# Patient Record
Sex: FEMALE | Race: WHITE | NOT HISPANIC OR LATINO | Employment: FULL TIME | URBAN - METROPOLITAN AREA
[De-identification: names, ages, dates, MRNs, and addresses within clinical notes are randomized per-mention and may not be internally consistent; named-entity substitution may affect disease eponyms.]

---

## 2017-08-31 ENCOUNTER — TRANSCRIBE ORDERS (OUTPATIENT)
Dept: ADMINISTRATIVE | Facility: HOSPITAL | Age: 42
End: 2017-08-31

## 2017-08-31 DIAGNOSIS — Z12.31 VISIT FOR SCREENING MAMMOGRAM: Primary | ICD-10-CM

## 2017-09-01 ENCOUNTER — HOSPITAL ENCOUNTER (OUTPATIENT)
Dept: RADIOLOGY | Age: 42
Discharge: HOME | End: 2017-09-01
Payer: COMMERCIAL

## 2017-09-01 DIAGNOSIS — Z12.31 VISIT FOR SCREENING MAMMOGRAM: ICD-10-CM

## 2017-09-01 PROCEDURE — 77063 BREAST TOMOSYNTHESIS BI: CPT

## 2017-09-01 PROCEDURE — G0202 SCR MAMMO BI INCL CAD: HCPCS

## 2018-09-07 ENCOUNTER — HOSPITAL ENCOUNTER (OUTPATIENT)
Dept: RADIOLOGY | Age: 43
Discharge: HOME | End: 2018-09-07
Payer: COMMERCIAL

## 2018-09-07 DIAGNOSIS — Z12.31 ENCOUNTER FOR SCREENING MAMMOGRAM FOR MALIGNANT NEOPLASM OF BREAST: ICD-10-CM

## 2018-09-07 PROCEDURE — 77067 SCR MAMMO BI INCL CAD: CPT

## 2018-09-07 PROCEDURE — 77063 BREAST TOMOSYNTHESIS BI: CPT

## 2019-08-22 PROCEDURE — 88304 TISSUE EXAM BY PATHOLOGIST: CPT | Performed by: PATHOLOGY

## 2019-08-23 ENCOUNTER — LAB REQUISITION (OUTPATIENT)
Dept: LAB | Facility: HOSPITAL | Age: 44
End: 2019-08-23
Payer: COMMERCIAL

## 2019-08-23 DIAGNOSIS — L91.8 OTHER HYPERTROPHIC DISORDERS OF THE SKIN: ICD-10-CM

## 2019-09-13 ENCOUNTER — HOSPITAL ENCOUNTER (OUTPATIENT)
Dept: RADIOLOGY | Age: 44
Discharge: HOME | End: 2019-09-13
Payer: COMMERCIAL

## 2019-09-13 VITALS — BODY MASS INDEX: 23.04 KG/M2 | HEIGHT: 63 IN | WEIGHT: 130 LBS

## 2019-09-13 DIAGNOSIS — Z12.31 ENCOUNTER FOR SCREENING MAMMOGRAM FOR MALIGNANT NEOPLASM OF BREAST: ICD-10-CM

## 2019-09-13 PROCEDURE — 77063 BREAST TOMOSYNTHESIS BI: CPT

## 2019-09-13 PROCEDURE — 77067 SCR MAMMO BI INCL CAD: CPT

## 2020-06-13 ENCOUNTER — APPOINTMENT (OUTPATIENT)
Dept: RADIOLOGY | Age: 45
End: 2020-06-13
Payer: COMMERCIAL

## 2020-06-13 ENCOUNTER — OFFICE VISIT (OUTPATIENT)
Dept: URGENT CARE | Age: 45
End: 2020-06-13
Payer: COMMERCIAL

## 2020-06-13 VITALS
WEIGHT: 130 LBS | RESPIRATION RATE: 18 BRPM | HEIGHT: 64 IN | HEART RATE: 92 BPM | DIASTOLIC BLOOD PRESSURE: 72 MMHG | BODY MASS INDEX: 22.2 KG/M2 | SYSTOLIC BLOOD PRESSURE: 136 MMHG | OXYGEN SATURATION: 98 % | TEMPERATURE: 98.2 F

## 2020-06-13 DIAGNOSIS — M25.571 ACUTE RIGHT ANKLE PAIN: ICD-10-CM

## 2020-06-13 DIAGNOSIS — M25.571 ACUTE RIGHT ANKLE PAIN: Primary | ICD-10-CM

## 2020-06-13 PROCEDURE — S9088 SERVICES PROVIDED IN URGENT: HCPCS | Performed by: PREVENTIVE MEDICINE

## 2020-06-13 PROCEDURE — 73610 X-RAY EXAM OF ANKLE: CPT

## 2020-06-13 PROCEDURE — 99213 OFFICE O/P EST LOW 20 MIN: CPT | Performed by: PREVENTIVE MEDICINE

## 2020-06-13 RX ORDER — ACETAMINOPHEN AND CODEINE PHOSPHATE 300; 30 MG/1; MG/1
1 TABLET ORAL EVERY 4 HOURS PRN
Qty: 30 TABLET | Refills: 0 | Status: SHIPPED | OUTPATIENT
Start: 2020-06-13 | End: 2020-06-24 | Stop reason: HOSPADM

## 2020-06-16 ENCOUNTER — HOSPITAL ENCOUNTER (OUTPATIENT)
Dept: RADIOLOGY | Age: 45
Discharge: HOME | End: 2020-06-16
Payer: COMMERCIAL

## 2020-06-16 ENCOUNTER — OFFICE VISIT (OUTPATIENT)
Dept: OBGYN CLINIC | Facility: CLINIC | Age: 45
End: 2020-06-16
Payer: COMMERCIAL

## 2020-06-16 VITALS
HEIGHT: 64 IN | BODY MASS INDEX: 22.31 KG/M2 | DIASTOLIC BLOOD PRESSURE: 82 MMHG | SYSTOLIC BLOOD PRESSURE: 143 MMHG | HEART RATE: 76 BPM

## 2020-06-16 DIAGNOSIS — S92.151A CLOSED DISPLACED AVULSION FRACTURE OF RIGHT TALUS, INITIAL ENCOUNTER: ICD-10-CM

## 2020-06-16 DIAGNOSIS — S92.151A CLOSED DISPLACED AVULSION FRACTURE OF RIGHT TALUS, INITIAL ENCOUNTER: Primary | ICD-10-CM

## 2020-06-16 PROCEDURE — 73700 CT LOWER EXTREMITY W/O DYE: CPT

## 2020-06-16 PROCEDURE — 99243 OFF/OP CNSLTJ NEW/EST LOW 30: CPT | Performed by: ORTHOPAEDIC SURGERY

## 2020-06-16 RX ORDER — ASPIRIN 325 MG
325 TABLET ORAL 2 TIMES DAILY
Qty: 84 TABLET | Refills: 0 | Status: SHIPPED | OUTPATIENT
Start: 2020-06-16 | End: 2020-06-24 | Stop reason: HOSPADM

## 2020-06-16 RX ORDER — ASPIRIN 325 MG
325 TABLET ORAL 2 TIMES DAILY
Qty: 84 TABLET | Refills: 0 | Status: CANCELLED | OUTPATIENT
Start: 2020-06-16 | End: 2020-07-28

## 2020-06-19 ENCOUNTER — OFFICE VISIT (OUTPATIENT)
Dept: OBGYN CLINIC | Facility: CLINIC | Age: 45
End: 2020-06-19
Payer: COMMERCIAL

## 2020-06-19 ENCOUNTER — ANESTHESIA EVENT (OUTPATIENT)
Dept: PERIOP | Facility: AMBULARY SURGERY CENTER | Age: 45
End: 2020-06-19
Payer: COMMERCIAL

## 2020-06-19 VITALS — HEIGHT: 64 IN | BODY MASS INDEX: 22.31 KG/M2

## 2020-06-19 DIAGNOSIS — S92.121A CLOSED DISPLACED FRACTURE OF BODY OF RIGHT TALUS, INITIAL ENCOUNTER: Primary | ICD-10-CM

## 2020-06-19 PROCEDURE — 99213 OFFICE O/P EST LOW 20 MIN: CPT | Performed by: ORTHOPAEDIC SURGERY

## 2020-06-19 RX ORDER — CHLORHEXIDINE GLUCONATE 4 G/100ML
SOLUTION TOPICAL DAILY PRN
Status: CANCELLED | OUTPATIENT
Start: 2020-06-19

## 2020-06-22 DIAGNOSIS — S92.121A CLOSED DISPLACED FRACTURE OF BODY OF RIGHT TALUS, INITIAL ENCOUNTER: ICD-10-CM

## 2020-06-22 PROCEDURE — U0003 INFECTIOUS AGENT DETECTION BY NUCLEIC ACID (DNA OR RNA); SEVERE ACUTE RESPIRATORY SYNDROME CORONAVIRUS 2 (SARS-COV-2) (CORONAVIRUS DISEASE [COVID-19]), AMPLIFIED PROBE TECHNIQUE, MAKING USE OF HIGH THROUGHPUT TECHNOLOGIES AS DESCRIBED BY CMS-2020-01-R: HCPCS

## 2020-06-23 LAB — SARS-COV-2 RNA SPEC QL NAA+PROBE: NOT DETECTED

## 2020-06-24 ENCOUNTER — HOSPITAL ENCOUNTER (OUTPATIENT)
Facility: AMBULARY SURGERY CENTER | Age: 45
Setting detail: OUTPATIENT SURGERY
Discharge: HOME | End: 2020-06-24
Attending: ORTHOPAEDIC SURGERY | Admitting: ORTHOPAEDIC SURGERY
Payer: COMMERCIAL

## 2020-06-24 ENCOUNTER — ANESTHESIA (OUTPATIENT)
Dept: PERIOP | Facility: AMBULARY SURGERY CENTER | Age: 45
End: 2020-06-24
Payer: COMMERCIAL

## 2020-06-24 ENCOUNTER — APPOINTMENT (OUTPATIENT)
Dept: RADIOLOGY | Facility: AMBULARY SURGERY CENTER | Age: 45
End: 2020-06-24
Payer: COMMERCIAL

## 2020-06-24 VITALS
OXYGEN SATURATION: 100 % | TEMPERATURE: 98.6 F | BODY MASS INDEX: 21.85 KG/M2 | RESPIRATION RATE: 18 BRPM | SYSTOLIC BLOOD PRESSURE: 122 MMHG | WEIGHT: 128 LBS | HEIGHT: 64 IN | DIASTOLIC BLOOD PRESSURE: 59 MMHG | HEART RATE: 79 BPM

## 2020-06-24 DIAGNOSIS — S92.151A CLOSED DISPLACED AVULSION FRACTURE OF RIGHT TALUS, INITIAL ENCOUNTER: Primary | ICD-10-CM

## 2020-06-24 PROCEDURE — 73620 X-RAY EXAM OF FOOT: CPT

## 2020-06-24 PROCEDURE — C1713 ANCHOR/SCREW BN/BN,TIS/BN: HCPCS | Performed by: ORTHOPAEDIC SURGERY

## 2020-06-24 PROCEDURE — C9290 INJ, BUPIVACAINE LIPOSOME: HCPCS | Performed by: ORTHOPAEDIC SURGERY

## 2020-06-24 PROCEDURE — 28445 OPTX TALUS FRACTURE: CPT | Performed by: ORTHOPAEDIC SURGERY

## 2020-06-24 DEVICE — HEADLESS SCREW
Type: IMPLANTABLE DEVICE | Site: FOOT | Status: FUNCTIONAL
Brand: MINI

## 2020-06-24 RX ORDER — OXYCODONE HYDROCHLORIDE 5 MG/1
5 TABLET ORAL EVERY 4 HOURS PRN
Qty: 30 TABLET | Refills: 0 | Status: SHIPPED | OUTPATIENT
Start: 2020-06-24 | End: 2020-06-29

## 2020-06-24 RX ORDER — MAGNESIUM HYDROXIDE 1200 MG/15ML
LIQUID ORAL AS NEEDED
Status: DISCONTINUED | OUTPATIENT
Start: 2020-06-24 | End: 2020-06-24 | Stop reason: HOSPADM

## 2020-06-24 RX ORDER — DEXAMETHASONE SODIUM PHOSPHATE 4 MG/ML
INJECTION, SOLUTION INTRA-ARTICULAR; INTRALESIONAL; INTRAMUSCULAR; INTRAVENOUS; SOFT TISSUE AS NEEDED
Status: DISCONTINUED | OUTPATIENT
Start: 2020-06-24 | End: 2020-06-24 | Stop reason: SURG

## 2020-06-24 RX ORDER — LIDOCAINE HYDROCHLORIDE 10 MG/ML
INJECTION, SOLUTION EPIDURAL; INFILTRATION; INTRACAUDAL; PERINEURAL AS NEEDED
Status: DISCONTINUED | OUTPATIENT
Start: 2020-06-24 | End: 2020-06-24 | Stop reason: SURG

## 2020-06-24 RX ORDER — CEFAZOLIN SODIUM 1 G/50ML
1000 SOLUTION INTRAVENOUS ONCE
Status: COMPLETED | OUTPATIENT
Start: 2020-06-24 | End: 2020-06-24

## 2020-06-24 RX ORDER — MIDAZOLAM HYDROCHLORIDE 2 MG/2ML
INJECTION, SOLUTION INTRAMUSCULAR; INTRAVENOUS AS NEEDED
Status: DISCONTINUED | OUTPATIENT
Start: 2020-06-24 | End: 2020-06-24 | Stop reason: SURG

## 2020-06-24 RX ORDER — ONDANSETRON 2 MG/ML
INJECTION INTRAMUSCULAR; INTRAVENOUS AS NEEDED
Status: DISCONTINUED | OUTPATIENT
Start: 2020-06-24 | End: 2020-06-24 | Stop reason: SURG

## 2020-06-24 RX ORDER — PROPOFOL 10 MG/ML
INJECTION, EMULSION INTRAVENOUS AS NEEDED
Status: DISCONTINUED | OUTPATIENT
Start: 2020-06-24 | End: 2020-06-24 | Stop reason: SURG

## 2020-06-24 RX ORDER — BUPIVACAINE HYDROCHLORIDE 5 MG/ML
INJECTION, SOLUTION PERINEURAL
Status: COMPLETED | OUTPATIENT
Start: 2020-06-24 | End: 2020-06-24

## 2020-06-24 RX ORDER — ONDANSETRON 2 MG/ML
4 INJECTION INTRAMUSCULAR; INTRAVENOUS ONCE
Status: DISCONTINUED | OUTPATIENT
Start: 2020-06-24 | End: 2020-06-24 | Stop reason: HOSPADM

## 2020-06-24 RX ORDER — CHLORHEXIDINE GLUCONATE 4 G/100ML
SOLUTION TOPICAL DAILY PRN
Status: DISCONTINUED | OUTPATIENT
Start: 2020-06-24 | End: 2020-06-24 | Stop reason: HOSPADM

## 2020-06-24 RX ORDER — SODIUM CHLORIDE, SODIUM LACTATE, POTASSIUM CHLORIDE, CALCIUM CHLORIDE 600; 310; 30; 20 MG/100ML; MG/100ML; MG/100ML; MG/100ML
125 INJECTION, SOLUTION INTRAVENOUS CONTINUOUS
Status: DISCONTINUED | OUTPATIENT
Start: 2020-06-24 | End: 2020-06-24 | Stop reason: HOSPADM

## 2020-06-24 RX ORDER — HYDROMORPHONE HCL/PF 1 MG/ML
0.5 SYRINGE (ML) INJECTION
Status: DISCONTINUED | OUTPATIENT
Start: 2020-06-24 | End: 2020-06-24 | Stop reason: HOSPADM

## 2020-06-24 RX ORDER — BUPIVACAINE HYDROCHLORIDE 2.5 MG/ML
INJECTION, SOLUTION EPIDURAL; INFILTRATION; INTRACAUDAL AS NEEDED
Status: DISCONTINUED | OUTPATIENT
Start: 2020-06-24 | End: 2020-06-24 | Stop reason: HOSPADM

## 2020-06-24 RX ORDER — FENTANYL CITRATE/PF 50 MCG/ML
50 SYRINGE (ML) INJECTION
Status: DISCONTINUED | OUTPATIENT
Start: 2020-06-24 | End: 2020-06-24 | Stop reason: HOSPADM

## 2020-06-24 RX ORDER — ONDANSETRON 4 MG/1
4 TABLET, FILM COATED ORAL EVERY 8 HOURS PRN
Qty: 20 TABLET | Refills: 0 | Status: SHIPPED | OUTPATIENT
Start: 2020-06-24

## 2020-06-24 RX ORDER — FENTANYL CITRATE 50 UG/ML
INJECTION, SOLUTION INTRAMUSCULAR; INTRAVENOUS AS NEEDED
Status: DISCONTINUED | OUTPATIENT
Start: 2020-06-24 | End: 2020-06-24 | Stop reason: SURG

## 2020-06-24 RX ORDER — ASPIRIN 325 MG
325 TABLET, DELAYED RELEASE (ENTERIC COATED) ORAL 2 TIMES DAILY
Qty: 84 TABLET | Refills: 0 | Status: SHIPPED | OUTPATIENT
Start: 2020-06-24

## 2020-06-24 RX ADMIN — FENTANYL CITRATE 25 MCG: 50 INJECTION, SOLUTION INTRAMUSCULAR; INTRAVENOUS at 09:56

## 2020-06-24 RX ADMIN — PROPOFOL 200 MG: 10 INJECTION, EMULSION INTRAVENOUS at 09:24

## 2020-06-24 RX ADMIN — CEFAZOLIN SODIUM 1000 MG: 1 SOLUTION INTRAVENOUS at 09:20

## 2020-06-24 RX ADMIN — LIDOCAINE HYDROCHLORIDE 50 MG: 10 INJECTION, SOLUTION EPIDURAL; INFILTRATION; INTRACAUDAL; PERINEURAL at 09:24

## 2020-06-24 RX ADMIN — SODIUM CHLORIDE, SODIUM LACTATE, POTASSIUM CHLORIDE, AND CALCIUM CHLORIDE: .6; .31; .03; .02 INJECTION, SOLUTION INTRAVENOUS at 08:59

## 2020-06-24 RX ADMIN — MIDAZOLAM HYDROCHLORIDE 2 MG: 1 INJECTION, SOLUTION INTRAMUSCULAR; INTRAVENOUS at 09:18

## 2020-06-24 RX ADMIN — DEXAMETHASONE SODIUM PHOSPHATE 4 MG: 4 INJECTION, SOLUTION INTRAMUSCULAR; INTRAVENOUS at 09:27

## 2020-06-24 RX ADMIN — FENTANYL CITRATE 50 MCG: 50 INJECTION, SOLUTION INTRAMUSCULAR; INTRAVENOUS at 09:18

## 2020-06-24 RX ADMIN — FENTANYL CITRATE 25 MCG: 50 INJECTION, SOLUTION INTRAMUSCULAR; INTRAVENOUS at 10:12

## 2020-06-24 RX ADMIN — BUPIVACAINE HYDROCHLORIDE 40 ML: 5 INJECTION, SOLUTION PERINEURAL at 09:25

## 2020-06-24 RX ADMIN — ONDANSETRON 4 MG: 2 INJECTION INTRAMUSCULAR; INTRAVENOUS at 09:27

## 2020-07-14 ENCOUNTER — OFFICE VISIT (OUTPATIENT)
Dept: OBGYN CLINIC | Facility: CLINIC | Age: 45
End: 2020-07-14

## 2020-07-14 VITALS — BODY MASS INDEX: 21.97 KG/M2 | HEIGHT: 64 IN

## 2020-07-14 DIAGNOSIS — S92.121A CLOSED DISPLACED FRACTURE OF BODY OF RIGHT TALUS, INITIAL ENCOUNTER: Primary | ICD-10-CM

## 2020-07-14 PROCEDURE — 99024 POSTOP FOLLOW-UP VISIT: CPT | Performed by: ORTHOPAEDIC SURGERY

## 2020-07-14 NOTE — PATIENT INSTRUCTIONS
Continue aspirin for blood clot prevention  May shower, do not soak in a tub/pool/ocean/etc for another 4 weeks  Begin PT  Scar massage- pea sized amount of lotion, massage into scar for 5 minutes each day  Compression stocking (Knee high, 20-30mm Hg) to be worn at all times  Recommend taking the following supplements: Vitamin D 4000 units per day and Calcium 1200 mg per day  This will help with bone healing  Wear the boot at all times except when showering and in PT, even to sleep at night

## 2020-07-14 NOTE — PROGRESS NOTES
KANIKA Maza  Attending, Orthopaedic Surgery  Foot and Ankle  [facility] Orthopaedic [facility]      ORTHOPAEDIC FOOT AND ANKLE POST-OP VISIT     Procedure:   ORIF of the lateral process of the talus       Date of surgery:   6/24/20      PLAN  1  Weightbearing Status- NWB operative extremity in cam boot  2  DVT prophylaxis- ASA 325mg BID  3  Begin PT at this time for gentle ROM and gentle strengthening  Nobody should be taking the ankle and forcing subtalar motion  4  Pain control- OTC pain medication  5  RTC in 3 week(s)  6  Xrays needed next visit - yes Weightbearing Ankle    History of Present Illness:   Chief Complaint:   Chief Complaint   Patient presents with    Right Ankle - Post-op     Colt Garcia is a 39 y o  female who is being seen for post-operative visit for the above procedure  Pain is well controlled and the patient has successfully transitioned to OTC pain medicines  she is taking ASA 325mg BID for DVT prophylaxis  Patient has been NWB in a CAM boot  Review of Systems:  General- denies fever/chills  Respiratory- denies cough or SOB  Cardio- denies chest pain or palpitations  GI- denies abdominal pain  Musculoskeletal- Negative except noted above  Skin- denies rashes or wounds    Physical Exam:   Ht 5' 4" (1 626 m)   BMI 21 97 kg/m²   General/Constitutional: No apparent distress: well-nourished and well developed  Eyes: normal ocular motion  Lymphatic: No appreciable lymphadenopathy  Respiratory: Non-labored breathing  Vascular: No edema, swelling or tenderness, except as noted in detailed exam   Integumentary: No impressive skin lesions present, except as noted in detailed exam   Neuro: No ataxia or tremors noted  Psych: Normal mood and affect, oriented to person, place and time  Appropriate affect  Musculoskeletal: Normal, except as noted in detailed exam and in HPI      Examination    right        Incision Clean, dry, intact  Sutures Removed this visit    Ecchymosis none    Swelling Mild    Sensation Intact to light touch throughout sural, saphenous, superficial peroneal, deep peroneal and medial/lateral plantar nerve distributions  [place] 5 07 filament (10g) testing deferred  Cardiovascular Brisk capillary refill < 2 seconds,intact DP and PT pulses    Special Tests None      Imaging Studies:   No new imaging    Scribe Attestation    I,:   Brendon Wolff PA-C am acting as a scribe while in the presence of the attending physician :        I,:   Patricia Tipton MD personally performed the services described in this documentation    as scribed in my presence :                Isiah Hoyer Lachman, MD  Foot & Ankle Surgery   Department of [address] [place] [address]      I personally performed the service  Isiah Hoyer Lachman, MD

## 2020-07-24 ENCOUNTER — EVALUATION (OUTPATIENT)
Dept: PHYSICAL THERAPY | Age: 45
End: 2020-07-24
Payer: COMMERCIAL

## 2020-07-24 DIAGNOSIS — S92.121D CLOSED DISPLACED FRACTURE OF BODY OF RIGHT TALUS WITH ROUTINE HEALING, SUBSEQUENT ENCOUNTER: ICD-10-CM

## 2020-07-24 DIAGNOSIS — M79.671 RIGHT FOOT PAIN: Primary | ICD-10-CM

## 2020-07-24 PROCEDURE — 97110 THERAPEUTIC EXERCISES: CPT | Performed by: PHYSICAL THERAPIST

## 2020-07-24 PROCEDURE — 97161 PT EVAL LOW COMPLEX 20 MIN: CPT | Performed by: PHYSICAL THERAPIST

## 2020-07-24 NOTE — PROGRESS NOTES
PT Evaluation     Today's date: 2020  Patient name: Colt Garcia  : 1975  MRN: [id number]  Referring provider: Sadie Oliva  Dx:   Encounter Diagnosis     ICD-10-CM    1  Right foot pain M79 671    2  Closed displaced fracture of body of right talus with routine healing, subsequent encounter S92 121D Ambulatory referral to Physical Therapy       Start Time: 845  Stop Time: 930  Total time in clinic (min): 45 minutes    Assessment  Assessment details: Pt is a 40 y/o female who presents with right foot pain following ORIF of Talus  No weight bearing at this time, Pt is to be wearing CAM boot at all times using crutches unless in PT or performing HEP  Pt was informed CAM boot should be put on after HEP completed  No forcing of subtalar motions at this time  No further referral is necessary at this time  Pt presents with, decreased strength, and ROM  Pt has a positive prognosis  Pt would benefit from PT to address these impairments leading to increased functional capacity and improved quality of life  Impairments: abnormal or restricted ROM, impaired balance, impaired physical strength, lacks appropriate home exercise program, pain with function and weight-bearing intolerance  Understanding of Dx/Px/POC: good   Prognosis: good    Goals  Short Term Goals: to be achieved by 4 weeks  1) Patient to be independent with basic HEP  2) Decrease pain to 2/10 at its worst   3) Increase ankle DF/PF ROM by 5-10 degrees   4) Increase LE strength by 1/2 MMT grade in all deficient planes  Long Term Goals: to be achieved by discharge  1) FOTO equal to or greater than 64   2) Ambulation to improve to maximal level of function  3) Stair negotiation will improve to reciprocal   4) Sit to stand transfers will improve to maximal level of function     Plan  Plan details: Pt will be seen once a week until weight bearing     Patient would benefit from: skilled physical therapy  Planned modality interventions: cryotherapy and thermotherapy: hydrocollator packs  Planned therapy interventions: neuromuscular re-education, patient education, stretching, strengthening, therapeutic activities, therapeutic exercise, therapeutic training, home exercise program and graded activity  Frequency: Twice a week for 10 weeks  Treatment plan discussed with: patient        Subjective Evaluation    History of Present Illness  Mechanism of injury: 1 month ago patient fell off ladder and suffered displaced fracture of right talus  Pt had no resolution of symptoms with conservative treatment and underwent ORIF 3 weeks ago (20)  Pt is non-weight bearing in CAM boot for next week 3 weeks  Pt is very pleasant and enjoy walking dog and taking 3 daughters to extra curricular activities  Quality of life: good    Pain  Current pain ratin  At best pain ratin  At worst pain ratin  Quality: dull ache  Aggravating factors: running, stair climbing, walking and standing    Hand dominance: right    Patient Goals  Patient goals for therapy: decreased pain, independence with ADLs/IADLs, return to sport/leisure activities, increased strength and increased motion          Objective     Active Range of Motion   Left Ankle/Foot   Dorsiflexion (ke): 18 degrees     Right Ankle/Foot   Dorsiflexion (ke): 8 degrees     Passive Range of Motion   Left Ankle/Foot    Dorsiflexion (ke): 20 degrees   Plantar flexion: 40 degrees     Right Ankle/Foot    Dorsiflexion (ke): 10 degrees   Plantar flexion: 25 degrees     Strength/Myotome Testing     Right Knee   Flexion: 4  Extension: 4    Right Ankle/Foot   Dorsiflexion: 4-  Plantar flexion: 4-    Additional Strength Details  Hip strength R MMT: 4/5 in all planes of motion   Pronation/supination was not tested due to surgeons orders of limited subtalar motion  General Comments: Ankle/Foot Comments   Pt demonstrates minimal swelling with ACE bandage         Flowsheet Rows      Most Recent Value   PT/OT G-Codes   Current Score  32   Projected Score  64             Precautions: N/A      Manuals                                                                 Neuro Re-Ed                                                                                                        Ther Ex 7/24            TB 4 way HEP            PF/DF stretch HEP                                                                                          Ther Activity                                       Gait Training                                       Modalities

## 2020-07-28 ENCOUNTER — OFFICE VISIT (OUTPATIENT)
Dept: PHYSICAL THERAPY | Age: 45
End: 2020-07-28
Payer: COMMERCIAL

## 2020-07-28 DIAGNOSIS — M79.671 RIGHT FOOT PAIN: Primary | ICD-10-CM

## 2020-07-28 DIAGNOSIS — S92.121D CLOSED DISPLACED FRACTURE OF BODY OF RIGHT TALUS WITH ROUTINE HEALING, SUBSEQUENT ENCOUNTER: ICD-10-CM

## 2020-07-28 PROCEDURE — 97112 NEUROMUSCULAR REEDUCATION: CPT | Performed by: PHYSICAL THERAPIST

## 2020-07-28 PROCEDURE — 97110 THERAPEUTIC EXERCISES: CPT | Performed by: PHYSICAL THERAPIST

## 2020-07-28 PROCEDURE — 97140 MANUAL THERAPY 1/> REGIONS: CPT | Performed by: PHYSICAL THERAPIST

## 2020-07-28 NOTE — PROGRESS NOTES
Daily Note     Today's date: 2020  Patient name: Eliel Palencia  : 1975  MRN: [id number]  Referring provider: Laureen Momin PA-C  Dx:   Encounter Diagnosis     ICD-10-CM    1  Right foot pain M79 671    2  Closed displaced fracture of body of right talus with routine healing, subsequent encounter S92 121D        Start Time: 1545  Stop Time: 1630  Total time in clinic (min): 45 minutes    Subjective: Pt reports improvements with symptoms  Objective: See treatment diary below      Assessment: Tolerated treatment well  Pt's POC was progressed to include 30% weight bearing on biodex  Pt demonstrated no adverse affects  Patient demonstrated fatigue post treatment and would benefit from continued PT      Plan: Continue per plan of care        Precautions: N/A; no forceful subtalar motion (pronation/supination)      Manuals             DF/PF ROM JF                                                   Neuro Re-Ed             Weight shifts biodex 10'            Toe curls towel 5'                                                                             Ther Ex             TB 2 way 30"*2            PF/DF stretch 30*5"                                                                                          Ther Activity                                       Gait Training                                       Modalities

## 2020-08-04 ENCOUNTER — OFFICE VISIT (OUTPATIENT)
Dept: PHYSICAL THERAPY | Age: 45
End: 2020-08-04
Payer: COMMERCIAL

## 2020-08-04 DIAGNOSIS — M79.671 RIGHT FOOT PAIN: Primary | ICD-10-CM

## 2020-08-04 DIAGNOSIS — S92.121D CLOSED DISPLACED FRACTURE OF BODY OF RIGHT TALUS WITH ROUTINE HEALING, SUBSEQUENT ENCOUNTER: ICD-10-CM

## 2020-08-04 PROCEDURE — 97140 MANUAL THERAPY 1/> REGIONS: CPT | Performed by: PHYSICAL THERAPIST

## 2020-08-04 PROCEDURE — 97112 NEUROMUSCULAR REEDUCATION: CPT | Performed by: PHYSICAL THERAPIST

## 2020-08-04 PROCEDURE — 97110 THERAPEUTIC EXERCISES: CPT | Performed by: PHYSICAL THERAPIST

## 2020-08-04 NOTE — PROGRESS NOTES
Daily Note     Today's date: 2020  Patient name: Mark Morales  : 1975  MRN: [id number]  Referring provider: Keyanna Harrell PA-C  Dx:   Encounter Diagnosis     ICD-10-CM    1  Right foot pain  M79 671    2  Closed displaced fracture of body of right talus with routine healing, subsequent encounter  S92 121D        Start Time: 0845  Stop Time: 930  Total time in clinic (min): 45 minutes    Subjective: Pt reports improvements with symptoms  Objective: See treatment diary below      Assessment: Tolerated treatment well  Pt's POC was progressed to include more Patient demonstrated fatigue post treatment and would benefit from continued PT      Plan: Continue per plan of care        Precautions: N/A; no forceful subtalar motion (pronation/supination)      Manuals            DF/PF ROM JF JF                                                  Neuro Re-Ed            Weight shifts biodex 10' 5'           Toe curls towel 5' 2*2"           Inv/Ev AROM  2*15           PF/DF BOSU   3*10                                                  Ther Ex            TB 2 way 30"*2 30*2"           PF/DF stretch 30*5" 30*5"           Nu-step  5'                                                                            Ther Activity                                       Gait Training                                       Modalities

## 2020-08-07 ENCOUNTER — APPOINTMENT (OUTPATIENT)
Dept: RADIOLOGY | Facility: AMBULARY SURGERY CENTER | Age: 45
End: 2020-08-07
Payer: COMMERCIAL

## 2020-08-07 ENCOUNTER — OFFICE VISIT (OUTPATIENT)
Dept: OBGYN CLINIC | Facility: CLINIC | Age: 45
End: 2020-08-07

## 2020-08-07 VITALS
SYSTOLIC BLOOD PRESSURE: 122 MMHG | HEART RATE: 80 BPM | DIASTOLIC BLOOD PRESSURE: 85 MMHG | BODY MASS INDEX: 21.97 KG/M2 | HEIGHT: 64 IN

## 2020-08-07 DIAGNOSIS — S92.121A CLOSED DISPLACED FRACTURE OF BODY OF RIGHT TALUS, INITIAL ENCOUNTER: Primary | ICD-10-CM

## 2020-08-07 DIAGNOSIS — S92.151D CLOSED DISPLACED AVULSION FRACTURE OF RIGHT TALUS WITH ROUTINE HEALING, SUBSEQUENT ENCOUNTER: ICD-10-CM

## 2020-08-07 DIAGNOSIS — S92.121A CLOSED DISPLACED FRACTURE OF BODY OF RIGHT TALUS, INITIAL ENCOUNTER: ICD-10-CM

## 2020-08-07 PROCEDURE — 99024 POSTOP FOLLOW-UP VISIT: CPT | Performed by: ORTHOPAEDIC SURGERY

## 2020-08-07 PROCEDURE — 73610 X-RAY EXAM OF ANKLE: CPT

## 2020-08-07 NOTE — PATIENT INSTRUCTIONS
2 days of partial weight bearing 50%  Then, 2 days of partial weight bearing 75%  Then, 2 days of partial weight bearing 90%  Then full weight bearing in the boot and wean your crutches/rolling walker  She should wear the boot while sleeping until she is fully weight bearing on the foot  Then 2 weeks of weightbearing as tolerated in the CAM boot  You may begin weaning your boot and transitioning to a sneaker (8/28/20)  It is important to do this gradually to avoid aggravating the healing process  1  8/28, you may come out of the boot into a sneaker for 2 hours  2  8/29, you may come out of the boot into a sneaker for 4 hours,  3  The next day, you may come out of the boot into a sneaker for 6 hours  4  Continue this (adding 2 hours per day) as you tolerate  For example, if you do 6 hours out of the boot into a sneaker and your foot swells more than usual at night and it is difficult to control the discomfort, do not advance to 8 hours the next day, stay at 6 hours until you are able to tolerate it  Elevation, Ice and tylenol and staying off of it at night will be important to aide in this transition out of the boot  Swelling and soreness are normal as you begin to do more with the injured leg  May DC aspirin/lovenox, no longer needed  Continue taking the following supplements: Vitamin D 4000 units per day and Calcium 1200 mg per day  This will help with bone healing  May shower, do not soak in a tub/pool/ocean/etc for another 4 weeks  Continue PT  Scar massage- pea sized amount of lotion, massage into scar for 5 minutes each day  Compression stocking (Knee high, 20-30mm Hg) to be worn at all times

## 2020-08-07 NOTE — PROGRESS NOTES
KANIKA Mak  Attending, Orthopaedic Surgery  Foot and Ankle  [facility] Orthopaedic [facility]      ORTHOPAEDIC FOOT AND ANKLE POST-OP VISIT     Procedure:     ORIF lateral process of the talus, right ankle       Date of surgery:   6/24/20      PLAN  1  Weightbearing Status- PWB operative extremity in cam boot  2  DVT prophylaxis-  BID  3  Continue to elevate 23hrs/day getting up 1x per hour to prevent a blood clot  4  Pain control- OTC pain medication  5  RTC in 6 week(s)  6  Xrays needed next visit - yes Weightbearing Ankle    History of Present Illness:   Chief Complaint: post-op follow up    Rayo Cross is a 39 y o  female who is being seen for post-operative visit for the above procedure  Pain is well controlled and the patient has successfully transitioned to OTC pain medicines  she is taking ASA 325mg BID for DVT prophylaxis  Patient has been NWB in a CAM boot  Review of Systems:  General- denies fever/chills  Respiratory- denies cough or SOB  Cardio- denies chest pain or palpitations  GI- denies abdominal pain  Musculoskeletal- Negative except noted above  Skin- denies rashes or wounds    Physical Exam:   /85   Pulse 80   Ht 5' 4" (1 626 m)   BMI 21 97 kg/m²   General/Constitutional: No apparent distress: well-nourished and well developed  Eyes: normal ocular motion  Lymphatic: No appreciable lymphadenopathy  Respiratory: Non-labored breathing  Vascular: No edema, swelling or tenderness, except as noted in detailed exam   Integumentary: No impressive skin lesions present, except as noted in detailed exam   Neuro: No ataxia or tremors noted  Psych: Normal mood and affect, oriented to person, place and time  Appropriate affect  Musculoskeletal: Normal, except as noted in detailed exam and in HPI  Examination    right        Incision Clean, dry, intact  Sutures Previously removed      Ecchymosis none    Swelling mild    Sensation Intact to light touch throughout sural, saphenous, superficial peroneal, deep peroneal and medial/lateral plantar nerve distributions  [place] 5 07 filament (10g) testing deferred  Cardiovascular Brisk capillary refill < 2 seconds,intact DP and PT pulses    Special Tests None      Imaging Studies:   3 views of the right ankle were taken, reviewed and interpreted independently that demonstrate hardware is in the expected position without evidence of loosening or hardware failure  Interval healing noted at fracture site  Reviewed by me personally  Scribe Attestation    I,:   Kip Chacko PA-C am acting as a scribe while in the presence of the attending physician :        I,:   Paco Dos Santos MD personally performed the services described in this documentation    as scribed in my presence :              Mona Smolder Lachman, MD  Foot & Ankle Surgery   Department of [address] [place] [address]      I personally performed the service  Mona Smolder Lachman, MD

## 2020-08-11 ENCOUNTER — OFFICE VISIT (OUTPATIENT)
Dept: PHYSICAL THERAPY | Age: 45
End: 2020-08-11
Payer: COMMERCIAL

## 2020-08-11 DIAGNOSIS — M79.671 RIGHT FOOT PAIN: Primary | ICD-10-CM

## 2020-08-11 DIAGNOSIS — S92.121D CLOSED DISPLACED FRACTURE OF BODY OF RIGHT TALUS WITH ROUTINE HEALING, SUBSEQUENT ENCOUNTER: ICD-10-CM

## 2020-08-11 PROCEDURE — 97116 GAIT TRAINING THERAPY: CPT | Performed by: PHYSICAL THERAPIST

## 2020-08-11 PROCEDURE — 97112 NEUROMUSCULAR REEDUCATION: CPT | Performed by: PHYSICAL THERAPIST

## 2020-08-11 PROCEDURE — 97110 THERAPEUTIC EXERCISES: CPT | Performed by: PHYSICAL THERAPIST

## 2020-08-11 PROCEDURE — 97140 MANUAL THERAPY 1/> REGIONS: CPT | Performed by: PHYSICAL THERAPIST

## 2020-08-11 NOTE — PROGRESS NOTES
Daily Note     Today's date: 2020  Patient name: Colt Garcia  : 1975  MRN: [id number]  Referring provider: Wilbur Santana PA-C  Dx:   Encounter Diagnosis     ICD-10-CM    1  Right foot pain  M79 671    2  Closed displaced fracture of body of right talus with routine healing, subsequent encounter  S92 121D        Start Time: 845   Stop Time: 930  Total time in clinic (min): 45 minutes    Subjective: Pt reports improvements with symptoms  Pt states that she had a good follow-up with surgeon  Objective: See treatment diary below      Assessment: Tolerated treatment well  Pt's POC was progressed to include 75% weight bearing in CAM boot during ambulation per protocol  Pt continues to have great response to treatment  Pt is to have the CAM boot on until , and then can start WEANING form boot  Patient demonstrated fatigue post treatment and would benefit from continued PT      Plan: Continue per plan of care        Precautions: N/A; no forceful subtalar motion (pronation/supination)      Manuals           DF/PF ROM JF JF JF                                                 Neuro Re-Ed            Weight shifts biodex 10' 5' 5'          Toe curls towel 5' 2*2"           Inv/Ev AROM  2*15           PF/DF BOSU   3*10           Mini squats at bar   2*10                                    Ther Ex            TB 2 way 30"*2 30*2"           PF/DF stretch 30*5" 30*5" 15*10"          Nu-step  5' 5' recumabnt bike next visit                                                                            Ther Activity                                       Gait Training             Crutches    15'                       Modalities

## 2020-08-14 ENCOUNTER — OFFICE VISIT (OUTPATIENT)
Dept: PHYSICAL THERAPY | Age: 45
End: 2020-08-14
Payer: COMMERCIAL

## 2020-08-14 DIAGNOSIS — M79.671 RIGHT FOOT PAIN: Primary | ICD-10-CM

## 2020-08-14 DIAGNOSIS — S92.121D CLOSED DISPLACED FRACTURE OF BODY OF RIGHT TALUS WITH ROUTINE HEALING, SUBSEQUENT ENCOUNTER: ICD-10-CM

## 2020-08-14 PROCEDURE — 97116 GAIT TRAINING THERAPY: CPT | Performed by: PHYSICAL THERAPIST

## 2020-08-14 PROCEDURE — 97112 NEUROMUSCULAR REEDUCATION: CPT | Performed by: PHYSICAL THERAPIST

## 2020-08-14 PROCEDURE — 97110 THERAPEUTIC EXERCISES: CPT | Performed by: PHYSICAL THERAPIST

## 2020-08-14 PROCEDURE — 97140 MANUAL THERAPY 1/> REGIONS: CPT | Performed by: PHYSICAL THERAPIST

## 2020-08-14 NOTE — PROGRESS NOTES
Daily Note     Today's date: 2020  Patient name: Evelyne Hackett  : 1975  MRN: [id number]  Referring provider: Nadia Deleon PA-C  Dx:   Encounter Diagnosis     ICD-10-CM    1  Right foot pain  M79 671    2  Closed displaced fracture of body of right talus with routine healing, subsequent encounter  S92 121D        Start Time: 0845  Stop Time: 930  Total time in clinic (min): 45 minutes    Subjective: Pt reports improvements with symptoms  Objective: See treatment diary below      Assessment: Tolerated treatment well  Pt's POC was progressed to include more weight bearing per protocol  Patient demonstrated fatigue post treatment and would benefit from continued PT      Plan: Continue per plan of care        Precautions: N/A; no forceful subtalar motion (pronation/supination)      Manuals          DF/PF ROM JF JF JF JF                                                Neuro Re-Ed          Weight shifts biodex 10' 5' 5' 5'         Toe curls towel 5' 2*2"  3*2"         Inv/Ev AROM  2*15           PF/DF BOSU   3*10  3*10         Mini squats at bar   2*10 2*10                                   Ther Ex          TB 2 way 30"*2 30*2"           PF/DF stretch 30*5" 30*5" 15*10" 15*10"         Nu-step  5' 5' recumabnt bike next visit  5' recumabnt bike next visit                                                                           Ther Activity                                       Gait Training             Crutches    15' 10'                      Modalities

## 2020-08-18 ENCOUNTER — OFFICE VISIT (OUTPATIENT)
Dept: PHYSICAL THERAPY | Age: 45
End: 2020-08-18
Payer: COMMERCIAL

## 2020-08-18 DIAGNOSIS — S92.121D CLOSED DISPLACED FRACTURE OF BODY OF RIGHT TALUS WITH ROUTINE HEALING, SUBSEQUENT ENCOUNTER: ICD-10-CM

## 2020-08-18 DIAGNOSIS — M79.671 RIGHT FOOT PAIN: Primary | ICD-10-CM

## 2020-08-18 PROCEDURE — 97110 THERAPEUTIC EXERCISES: CPT | Performed by: PHYSICAL THERAPIST

## 2020-08-18 PROCEDURE — 97116 GAIT TRAINING THERAPY: CPT | Performed by: PHYSICAL THERAPIST

## 2020-08-18 PROCEDURE — 97140 MANUAL THERAPY 1/> REGIONS: CPT | Performed by: PHYSICAL THERAPIST

## 2020-08-18 PROCEDURE — 97112 NEUROMUSCULAR REEDUCATION: CPT | Performed by: PHYSICAL THERAPIST

## 2020-08-18 NOTE — PROGRESS NOTES
Daily Note     Today's date: 2020  Patient name: Neeru Byrnes  : 1975  MRN: [id number]  Referring provider: Brianna Schneider PA-C  Dx:   Encounter Diagnosis     ICD-10-CM    1  Right foot pain  M79 671    2  Closed displaced fracture of body of right talus with routine healing, subsequent encounter  S92 121D        Start Time: 0845  Stop Time: 930  Total time in clinic (min): 45 minutes    Subjective: Pt reports improvements with ambulating with increased WB status  Objective: See treatment diary below      Assessment: Tolerated treatment well  Pt's POC was progressed to include gait training and assessment without AD and wearing CAM boot  Pt is still most appropriate for CAM boot with one crutch while ambulating  Patient demonstrated fatigue post treatment and would benefit from continued PT      Plan: Continue per plan of care        Precautions: N/A; no forceful subtalar motion (pronation/supination)      Manuals          DF/PF ROM JF JF JF JF                                                Neuro Re-Ed          Weight shifts biodex 10' 5' 5' 5'         Toe curls towel 5' 2*2"  3*2"         Inv/Ev AROM  2*15           PF/DF BOSU   3*10  3*10         Mini squats at bar   2*10 2*10                                   Ther Ex          TB 2 way 30"*2 30*2"           PF/DF stretch 30*5" 30*5" 15*10" 15*10"         Nu-step  5' 5' recumabnt bike next visit  5' recumabnt bike next visit                                                                           Ther Activity                                       Gait Training             Crutches    15' 10'                      Modalities

## 2020-08-21 ENCOUNTER — OFFICE VISIT (OUTPATIENT)
Dept: PHYSICAL THERAPY | Age: 45
End: 2020-08-21
Payer: COMMERCIAL

## 2020-08-21 DIAGNOSIS — S92.121D CLOSED DISPLACED FRACTURE OF BODY OF RIGHT TALUS WITH ROUTINE HEALING, SUBSEQUENT ENCOUNTER: ICD-10-CM

## 2020-08-21 DIAGNOSIS — M79.671 RIGHT FOOT PAIN: Primary | ICD-10-CM

## 2020-08-21 PROCEDURE — 97110 THERAPEUTIC EXERCISES: CPT | Performed by: PHYSICAL THERAPIST

## 2020-08-21 PROCEDURE — 97140 MANUAL THERAPY 1/> REGIONS: CPT | Performed by: PHYSICAL THERAPIST

## 2020-08-21 PROCEDURE — 97116 GAIT TRAINING THERAPY: CPT | Performed by: PHYSICAL THERAPIST

## 2020-08-21 PROCEDURE — 97112 NEUROMUSCULAR REEDUCATION: CPT | Performed by: PHYSICAL THERAPIST

## 2020-08-21 NOTE — PROGRESS NOTES
Daily Note     Today's date: 2020  Patient name: Riaz Rudolph  : 1975  MRN: [id number]  Referring provider: Laura Shearer PA-C  Dx:   Encounter Diagnosis     ICD-10-CM    1  Right foot pain  M79 671    2  Closed displaced fracture of body of right talus with routine healing, subsequent encounter  S92 121D        Start Time: 0800  Stop Time: 0845  Total time in clinic (min): 45 minutes    Subjective: Pt reports improvements with weight bearing status  Objective: See treatment diary below      Assessment: Tolerated treatment well  Pt was educated on increasing tolerance to ambulation without crutches  Pt is able to ambulate for 2 hours without AD, pt was educated on trying to walk 4 hours without AD  Patient demonstrated fatigue post treatment and would benefit from continued PT      Plan: Continue per plan of care         Precautions: N/A; no forceful subtalar motion (pronation/supination)      Manuals          DF/PF ROM JF JF JF JF                                                Neuro Re-Ed          Weight shifts biodex 10' 5' 5' 5'         Toe curls towel 5' 2*2"  3*2"         Inv/Ev AROM  2*15           PF/DF BOSU   3*10  3*10         Mini squats at bar   2*10 2*10                                   Ther Ex          TB 2 way 30"*2 30*2"           PF/DF stretch 30*5" 30*5" 15*10" 15*10"         Nu-step  5' 5' recumabnt bike next visit  5' recumabnt bike next visit                                                                           Ther Activity                                       Gait Training             Crutches    15' 10'                      Modalities

## 2020-08-25 ENCOUNTER — OFFICE VISIT (OUTPATIENT)
Dept: PHYSICAL THERAPY | Age: 45
End: 2020-08-25
Payer: COMMERCIAL

## 2020-08-25 DIAGNOSIS — M79.671 RIGHT FOOT PAIN: Primary | ICD-10-CM

## 2020-08-25 DIAGNOSIS — S92.121D CLOSED DISPLACED FRACTURE OF BODY OF RIGHT TALUS WITH ROUTINE HEALING, SUBSEQUENT ENCOUNTER: ICD-10-CM

## 2020-08-25 PROCEDURE — 97110 THERAPEUTIC EXERCISES: CPT | Performed by: PHYSICAL THERAPIST

## 2020-08-25 PROCEDURE — 97112 NEUROMUSCULAR REEDUCATION: CPT | Performed by: PHYSICAL THERAPIST

## 2020-08-25 PROCEDURE — 97140 MANUAL THERAPY 1/> REGIONS: CPT | Performed by: PHYSICAL THERAPIST

## 2020-08-25 NOTE — PROGRESS NOTES
Daily Note     Today's date: 2020  Patient name: Robert Katz  : 1975  MRN: [id number]  Referring provider: Sukhi Russo PA-C  Dx:   Encounter Diagnosis     ICD-10-CM    1  Right foot pain  M79 671    2  Closed displaced fracture of body of right talus with routine healing, subsequent encounter  S92 121D        Start Time: 0845  Stop Time: 930  Total time in clinic (min): 45 minutes    Subjective: Pt reports improvements with symptoms  Objective: See treatment diary below      Assessment: Tolerated treatment well  Patient demonstrated fatigue post treatment and would benefit from continued PT      Plan: Continue per plan of care        Precautions: N/A; no forceful subtalar motion (pronation/supination)      Manuals         DF/PF ROM JF JF JF JF JF        Light TC joint mobs     JF                                  Neuro Re-Ed         Weight shifts biodex 10' 5' 5' 5' 5'        Toe curls towel 5' 2*2"  3*2"         Inv/Ev AROM  2*15           PF/DF BOSU   3*10  3*10         Mini squats at bar   2*10 2*10 3*10                                  Ther Ex          TB 2 way 30"*2 30*2"           PF/DF stretch 30*5" 30*5" 15*10" 15*10"         Nu-step  5' 5' recumabnt bike next visit  5' recumabnt bike next visit                                                                           Ther Activity                                       Gait Training            Weight shifts     5*10        Shoe with cane     15'        Modalities

## 2020-08-28 ENCOUNTER — OFFICE VISIT (OUTPATIENT)
Dept: PHYSICAL THERAPY | Age: 45
End: 2020-08-28
Payer: COMMERCIAL

## 2020-08-28 DIAGNOSIS — M79.671 RIGHT FOOT PAIN: Primary | ICD-10-CM

## 2020-08-28 DIAGNOSIS — S92.121D CLOSED DISPLACED FRACTURE OF BODY OF RIGHT TALUS WITH ROUTINE HEALING, SUBSEQUENT ENCOUNTER: ICD-10-CM

## 2020-08-28 PROCEDURE — 97112 NEUROMUSCULAR REEDUCATION: CPT | Performed by: PHYSICAL THERAPIST

## 2020-08-28 PROCEDURE — 97140 MANUAL THERAPY 1/> REGIONS: CPT | Performed by: PHYSICAL THERAPIST

## 2020-08-28 PROCEDURE — 97110 THERAPEUTIC EXERCISES: CPT | Performed by: PHYSICAL THERAPIST

## 2020-08-28 NOTE — PROGRESS NOTES
Daily Note     Today's date: 2020  Patient name: Beth Willett  : 1975  MRN: [id number]  Referring provider: Maria Del Rosario Adame PA-C  Dx:   Encounter Diagnosis     ICD-10-CM    1  Right foot pain  M79 671    2  Closed displaced fracture of body of right talus with routine healing, subsequent encounter  S92 121D        Start Time: 0800  Stop Time: 0845  Total time in clinic (min): 45 minutes    Subjective: Pt reports improvements with tolerance to ambulation  Objective: See treatment diary below      Assessment: Tolerated treatment well  Pt is able to tolerate 20 minutes of walking in sneaker a day   The rest of the day she is walking without AD's and in CAM boot  Pt was educated on increasing tolerance to sneaker walking  Patient demonstrated fatigue post treatment and would benefit from continued PT      Plan: Continue per plan of care        Precautions: N/A; no forceful subtalar motion (pronation/supination)      Manuals        DF/PF ROM JF JF JF JF JF VK       Light TC joint mobs     JF JF                                 Neuro Re-Ed        Weight shifts biodex 10' 5' 5' 5' 5' 5'       Toe curls towel 5' 2*2"  3*2"         Inv/Ev AROM  2*15           PF/DF BOSU   3*10  3*10         Mini squats at bar   2*10 2*10 3*10 3*10       Vigor gym      2*2'                    Ther Ex          TB 2 way 30"*2 30*2"           PF/DF stretch 30*5" 30*5" 15*10" 15*10"         Nu-step  5' 5' recumabnt bike next visit  5' recumabnt bike next visit   5' bike upright shoe                                                                        Ther Activity                                       Gait Training           Weight shifts     5*10 5*10       Shoe with cane     15' 10'       Modalities

## 2020-09-01 ENCOUNTER — OFFICE VISIT (OUTPATIENT)
Dept: PHYSICAL THERAPY | Age: 45
End: 2020-09-01
Payer: COMMERCIAL

## 2020-09-01 DIAGNOSIS — M79.671 RIGHT FOOT PAIN: Primary | ICD-10-CM

## 2020-09-01 DIAGNOSIS — S92.121D CLOSED DISPLACED FRACTURE OF BODY OF RIGHT TALUS WITH ROUTINE HEALING, SUBSEQUENT ENCOUNTER: ICD-10-CM

## 2020-09-01 PROCEDURE — 97112 NEUROMUSCULAR REEDUCATION: CPT | Performed by: PHYSICAL THERAPIST

## 2020-09-01 PROCEDURE — 97110 THERAPEUTIC EXERCISES: CPT | Performed by: PHYSICAL THERAPIST

## 2020-09-01 PROCEDURE — 97140 MANUAL THERAPY 1/> REGIONS: CPT | Performed by: PHYSICAL THERAPIST

## 2020-09-01 NOTE — PROGRESS NOTES
Daily Note     Today's date: 2020  Patient name: Rayo Cross  : 1975  MRN: [id number]  Referring provider: Griselda Kimble PA-C  Dx:   Encounter Diagnosis     ICD-10-CM    1  Right foot pain  M79 671    2  Closed displaced fracture of body of right talus with routine healing, subsequent encounter  S92 121D        Start Time: 0845  Stop Time: 930  Total time in clinic (min): 45 minutes    Subjective: Pt reports improvements in with tolerance to community ambulation (2 hours tolerance with sneaker no AD)  Objective: See treatment diary below      Assessment: Tolerated treatment well  Pt demonstrates decreased lateral malleous mobility and MWM was included to increase tolerance to community ambulation  Patient demonstrated fatigue post treatment and would benefit from continued PT      Plan: Continue per plan of care        Precautions: N/A; no forceful subtalar motion (pronation/supination)      Manuals       DF/PF ROM JF JF JF JF JF VK       Light TC joint mobs     JF JF JF      Lateral malleous mobs       JF                   Neuro Re-Ed       Weight shifts biodex 10' 5' 5' 5' 5' 5'       Toe curls towel 5' 2*2"  3*2"         Inv/Ev AROM  2*15           PF/DF BOSU   3*10  3*10         Mini squats at bar   2*10 2*10 3*10 3*10 3*10 biodex      Vigor gym      2*2' 3*2' + mwm 2x15                   Ther Ex       TB 2 way 30"*2 30*2"           PF/DF stretch 30*5" 30*5" 15*10" 15*10"   5*30"      Nu-step  5' 5' recumabnt bike next visit  5' recumabnt bike next visit   5' bike upright shoe 5' bike upright shoe                                                                       Ther Activity                                       Gait Training          Weight shifts     5*10 5*10       Shoe with cane     15' 10' 10' no cane      Modalities

## 2020-09-04 ENCOUNTER — OFFICE VISIT (OUTPATIENT)
Dept: PHYSICAL THERAPY | Age: 45
End: 2020-09-04
Payer: COMMERCIAL

## 2020-09-04 DIAGNOSIS — S92.121D CLOSED DISPLACED FRACTURE OF BODY OF RIGHT TALUS WITH ROUTINE HEALING, SUBSEQUENT ENCOUNTER: ICD-10-CM

## 2020-09-04 DIAGNOSIS — M79.671 RIGHT FOOT PAIN: Primary | ICD-10-CM

## 2020-09-04 PROCEDURE — 97112 NEUROMUSCULAR REEDUCATION: CPT | Performed by: PHYSICAL THERAPIST

## 2020-09-04 PROCEDURE — 97110 THERAPEUTIC EXERCISES: CPT | Performed by: PHYSICAL THERAPIST

## 2020-09-04 PROCEDURE — 97140 MANUAL THERAPY 1/> REGIONS: CPT | Performed by: PHYSICAL THERAPIST

## 2020-09-04 NOTE — PROGRESS NOTES
Daily Note     Today's date: 2020  Patient name: Monie Chacon  : 1975  MRN: [id number]  Referring provider: Glo Ngo PA-C  Dx:   Encounter Diagnosis     ICD-10-CM    1  Right foot pain  M79 671    2  Closed displaced fracture of body of right talus with routine healing, subsequent encounter  S92 121D        Start Time: 845  Stop Time: 930  Total time in clinic (min): 45 minutes    Subjective: Pt demonstrates improvements with tolerance to community ambulation  Objective: See treatment diary below      Assessment: Tolerated treatment well  Pt's required increased manual techniques this session due to increased amounts of community ambulation  Pt is able to ambulate 8 hours with just sneaker and no AD  Patient demonstrated fatigue post treatment and would benefit from continued PT      Plan: Continue per plan of care        Precautions: N/A; no forceful subtalar motion (pronation/supination)      Manuals       DF/PF ROM JF JF JF JF JF VK       Light TC joint mobs     JF JF JF      Lateral malleous mobs       JF                   Neuro Re-Ed  94      Weight shifts biodex 10' 5' 5' 5' 5' 5'       Toe curls towel 5' 2*2"  3*2"         Inv/Ev AROM  2*15           PF/DF BOSU   3*10  3*10         Mini squats at bar   2*10 2*10 3*10 3*10       Vigor gym      2*2' 3*2' + mwm 2x15                   Ther Ex       TB 2 way 30"*2 30*2"           PF/DF stretch 30*5" 30*5" 15*10" 15*10"   5*30"      Nu-step  5' 5' recumabnt bike next visit  5' recumabnt bike next visit   5' bike upright shoe 5' bike upright shoe                                                                       Ther Activity                                       Gait Training     9      Weight shifts     5*10 5*10       Shoe with cane     15' 10' 10' no cane      Modalities

## 2020-09-08 ENCOUNTER — OFFICE VISIT (OUTPATIENT)
Dept: PHYSICAL THERAPY | Age: 45
End: 2020-09-08
Payer: COMMERCIAL

## 2020-09-08 DIAGNOSIS — S92.121D CLOSED DISPLACED FRACTURE OF BODY OF RIGHT TALUS WITH ROUTINE HEALING, SUBSEQUENT ENCOUNTER: ICD-10-CM

## 2020-09-08 DIAGNOSIS — M79.671 RIGHT FOOT PAIN: Primary | ICD-10-CM

## 2020-09-08 PROCEDURE — 97112 NEUROMUSCULAR REEDUCATION: CPT | Performed by: PHYSICAL THERAPIST

## 2020-09-08 PROCEDURE — 97140 MANUAL THERAPY 1/> REGIONS: CPT | Performed by: PHYSICAL THERAPIST

## 2020-09-08 PROCEDURE — 97110 THERAPEUTIC EXERCISES: CPT | Performed by: PHYSICAL THERAPIST

## 2020-09-08 NOTE — PROGRESS NOTES
Daily Note     Today's date: 2020  Patient name: Starr Webster  : 1975  MRN: [id number]  Referring provider: Daryl Rowell PA-C  Dx:   Encounter Diagnosis     ICD-10-CM    1  Right foot pain  M79 671    2  Closed displaced fracture of body of right talus with routine healing, subsequent encounter  S92 121D        Start Time: 845  Stop Time: 930  Total time in clinic (min): 45 minutes    Subjective: Pt reports improvements with tolerance to community ambulation  Objective: See treatment diary below      Assessment: Tolerated treatment well  Pt's POC was progressed to include more proximal hip strengthening in functional closed chain position to increase ankle stability  Patient demonstrated fatigue post treatment and would benefit from continued PT      Plan: Continue per plan of care        Precautions: N/A; no forceful subtalar motion (pronation/supination)      Manuals  9      DF/PF ROM JF JF JF JF JF VK       Light TC joint mobs     JF JF JF      Lateral malleous mobs       JF                   Neuro Re-Ed  9/7      Weight shifts biodex 10' 5' 5' 5' 5' 5'       Toe curls towel 5' 2*2"  3*2"         Inv/Ev AROM  2*15           PF/DF BOSU   3*10  3*10         Mini squats at bar   2*10 2*10 3*10 3*10       Vigor gym      2*2' 3*2' + mwm 2x15      Monster walks + lateral walks       RTB 2 laps ea      Ther Ex       TB 2 way 30"*2 30*2"           PF/DF stretch 30*5" 30*5" 15*10" 15*10"   5*30"      Nu-step  5' 5' recumabnt bike next visit  5' recumabnt bike next visit   5' bike upright shoe 5' bike upright shoe                                                                       Ther Activity                                       Gait Training     9      Weight shifts     5*10 5*10       Shoe with cane     15' 10' 10' no cane      Modalities

## 2020-09-11 ENCOUNTER — OFFICE VISIT (OUTPATIENT)
Dept: PHYSICAL THERAPY | Age: 45
End: 2020-09-11
Payer: COMMERCIAL

## 2020-09-11 DIAGNOSIS — M79.671 RIGHT FOOT PAIN: Primary | ICD-10-CM

## 2020-09-11 DIAGNOSIS — S92.121D CLOSED DISPLACED FRACTURE OF BODY OF RIGHT TALUS WITH ROUTINE HEALING, SUBSEQUENT ENCOUNTER: ICD-10-CM

## 2020-09-11 PROCEDURE — 97116 GAIT TRAINING THERAPY: CPT | Performed by: PHYSICAL THERAPIST

## 2020-09-11 PROCEDURE — 97140 MANUAL THERAPY 1/> REGIONS: CPT | Performed by: PHYSICAL THERAPIST

## 2020-09-11 PROCEDURE — 97110 THERAPEUTIC EXERCISES: CPT | Performed by: PHYSICAL THERAPIST

## 2020-09-11 PROCEDURE — 97112 NEUROMUSCULAR REEDUCATION: CPT | Performed by: PHYSICAL THERAPIST

## 2020-09-11 NOTE — PROGRESS NOTES
Daily Note     Today's date: 2020  Patient name: Omer Palma  : 1975  MRN: [id number]  Referring provider: Srirma Dennison PA-C  Dx:   Encounter Diagnosis     ICD-10-CM    1  Right foot pain  M79 671    2  Closed displaced fracture of body of right talus with routine healing, subsequent encounter  S92 121D        Start Time: 0900  Stop Time: 0945  Total time in clinic (min): 45 minutes    Subjective: Pt reports improvements in symptoms  Objective: See treatment diary below      Assessment: Tolerated treatment well  Pt's POC was progressed to include more weight bearing interventions to increase tolerance to stair navigation and increase DF ROM  Patient demonstrated fatigue post treatment and would benefit from continued PT      Plan: Continue per plan of care        Precautions: N/A; no forceful subtalar motion (pronation/supination)      Manuals      DF/PF ROM JF JF JF JF JF VK       Light TC joint mobs     JF JF JF JF     Lateral malleous mobs       JF JF                  Neuro Re-Ed      Weight shifts biodex 10' 5' 5' 5' 5' 5'       Toe curls towel 5' 2*2"  3*2"         Inv/Ev AROM  2*15           PF/DF BOSU   3*10  3*10         Mini squats at bar   2*10 2*10 3*10 3*10       Vigor gym      2*2' 3*2' + mwm 2x15 3*2' + mwm 2x15     Monster walks + lateral walks       RTB 2 laps ea      Ther Ex       TB 2 way 30"*2 30*2"           PF/DF stretch 30*5" 30*5" 15*10" 15*10"   5*30" 15*10" in standing     Nu-step  5' 5' recumabnt bike next visit  5' recumabnt bike next visit   5' bike upright shoe 5' bike upright shoe 5' bike                                                                      Ther Activity                                       Gait Training         Weight shifts     5*10 5*10       Shoe with cane     15' 10' 10' no cane 10' stair navigation      Modalities

## 2020-09-15 ENCOUNTER — OFFICE VISIT (OUTPATIENT)
Dept: PHYSICAL THERAPY | Age: 45
End: 2020-09-15
Payer: COMMERCIAL

## 2020-09-15 DIAGNOSIS — S92.121D CLOSED DISPLACED FRACTURE OF BODY OF RIGHT TALUS WITH ROUTINE HEALING, SUBSEQUENT ENCOUNTER: ICD-10-CM

## 2020-09-15 DIAGNOSIS — M79.671 RIGHT FOOT PAIN: Primary | ICD-10-CM

## 2020-09-15 PROCEDURE — 97116 GAIT TRAINING THERAPY: CPT | Performed by: PHYSICAL THERAPIST

## 2020-09-15 PROCEDURE — 97112 NEUROMUSCULAR REEDUCATION: CPT | Performed by: PHYSICAL THERAPIST

## 2020-09-15 PROCEDURE — 97110 THERAPEUTIC EXERCISES: CPT | Performed by: PHYSICAL THERAPIST

## 2020-09-15 PROCEDURE — 97140 MANUAL THERAPY 1/> REGIONS: CPT | Performed by: PHYSICAL THERAPIST

## 2020-09-15 NOTE — PROGRESS NOTES
Daily Note     Today's date: 9/15/2020  Patient name: Riaz Rudolph  : 1975  MRN: [id number]  Referring provider: Laura Shearer PA-C  Dx:   Encounter Diagnosis     ICD-10-CM    1  Right foot pain  M79 671    2  Closed displaced fracture of body of right talus with routine healing, subsequent encounter  S92 121D        Start Time: 0845  Stop Time: 930  Total time in clinic (min): 45 minutes    Subjective: Pt reports improvements in with tolerance to community ambulation  Objective: See treatment diary below      Assessment: Tolerated treatment well  Pt's POC was progressed to include more advanced squat mechanics to increase tolerance to functional transfers  Patient demonstrated fatigue post treatment and would benefit from continued PT      Plan: Continue per plan of care        Precautions: N/A; no forceful subtalar motion (pronation/supination)      Manuals 7/28 8/4 8/11 8/20 8/24 8/27 9/7 9/15     DF/PF ROM JF JF JF JF JF VK       Light TC joint mobs     JF JF JF JF     Lateral malleous mobs       JF JF                  Neuro Re-Ed 7/28 8/4  8/18 8/24 8/27 9/7 9/15     Weight shifts biodex 10' 5' 5' 5' 5' 5'       Toe curls towel 5' 2*2"  3*2"         Inv/Ev AROM  2*15           PF/DF BOSU   3*10  3*10         Mini squats at bar   2*10 2*10 3*10 3*10  3*10 with foot instrinsics     Vigor gym      2*2' 3*2' + mwm 2x15 3*2' + mwm 2x15     Monster walks + lateral walks       RTB 2 laps ea      Ther Ex       TB 2 way 30"*2 30*2"           PF/DF stretch 30*5" 30*5" 15*10" 15*10"   5*30" 15*10" in standing     Nu-step  5' 5' recumabnt bike next visit  5' recumabnt bike next visit   5' bike upright shoe 5' bike upright shoe 5' bike                                                                      Ther Activity                                       Gait Training         Weight shifts     5*10 5*10       Shoe with cane     15' 10' 10' no cane 10' stair navigation      Modalities

## 2020-09-17 ENCOUNTER — HOSPITAL ENCOUNTER (OUTPATIENT)
Dept: RADIOLOGY | Age: 45
Discharge: HOME | End: 2020-09-17
Payer: COMMERCIAL

## 2020-09-17 VITALS — WEIGHT: 130 LBS | BODY MASS INDEX: 22.2 KG/M2 | HEIGHT: 64 IN

## 2020-09-17 DIAGNOSIS — Z12.31 ENCOUNTER FOR SCREENING MAMMOGRAM FOR MALIGNANT NEOPLASM OF BREAST: ICD-10-CM

## 2020-09-17 PROCEDURE — 77063 BREAST TOMOSYNTHESIS BI: CPT

## 2020-09-17 PROCEDURE — 77067 SCR MAMMO BI INCL CAD: CPT

## 2020-09-18 ENCOUNTER — APPOINTMENT (OUTPATIENT)
Dept: PHYSICAL THERAPY | Age: 45
End: 2020-09-18
Payer: COMMERCIAL

## 2020-09-18 ENCOUNTER — OFFICE VISIT (OUTPATIENT)
Dept: OBGYN CLINIC | Facility: CLINIC | Age: 45
End: 2020-09-18

## 2020-09-18 ENCOUNTER — APPOINTMENT (OUTPATIENT)
Dept: RADIOLOGY | Facility: AMBULARY SURGERY CENTER | Age: 45
End: 2020-09-18
Attending: ORTHOPAEDIC SURGERY
Payer: COMMERCIAL

## 2020-09-18 VITALS
WEIGHT: 129 LBS | HEIGHT: 64 IN | HEART RATE: 91 BPM | SYSTOLIC BLOOD PRESSURE: 135 MMHG | BODY MASS INDEX: 22.02 KG/M2 | DIASTOLIC BLOOD PRESSURE: 86 MMHG

## 2020-09-18 DIAGNOSIS — S92.121A CLOSED DISPLACED FRACTURE OF BODY OF RIGHT TALUS, INITIAL ENCOUNTER: ICD-10-CM

## 2020-09-18 DIAGNOSIS — S92.121A CLOSED DISPLACED FRACTURE OF BODY OF RIGHT TALUS, INITIAL ENCOUNTER: Primary | ICD-10-CM

## 2020-09-18 PROCEDURE — 99024 POSTOP FOLLOW-UP VISIT: CPT | Performed by: ORTHOPAEDIC SURGERY

## 2020-09-18 PROCEDURE — 73610 X-RAY EXAM OF ANKLE: CPT

## 2020-09-18 NOTE — PROGRESS NOTES
KANIKA Moreno  Attending, Orthopaedic Surgery  Foot and Ankle  [facility]      ORTHOPAEDIC FOOT AND ANKLE POST-OP VISIT     Procedure:     ORIF lateral process of the talus, right ankle       Date of surgery:   6/24/20      PLAN  1  Weightbearing Status- WBAT operative extremity in a stiff soled shoe  2  DVT prophylaxis- completed  3  Continue physical therapy until released by physical therapist   4  Pain control- OTC pain medication  5  RTC in PRN   6  Xrays needed next visit - no   7  Advised on activity hardening  History of Present Illness:   Chief Complaint:   Chief Complaint   Patient presents with    Right Ankle - Post-op     Mikey Juan is a 39 y o  female who is being seen for post-operative visit for the above procedure  Pain is well controlled and the patient has successfully transitioned to OTC pain medicines  she has completed ASA 325mg BID for DVT prophylaxis  Patient has been WBAT in a stiff soled sneaker  Review of Systems:  General- denies fever/chills  Respiratory- denies cough or SOB  Cardio- denies chest pain or palpitations  GI- denies abdominal pain  Musculoskeletal- Negative except noted above  Skin- denies rashes or wounds    Physical Exam:   /86 (BP Location: Right arm, Patient Position: Sitting, Cuff Size: Adult)   Pulse 91   Ht 5' 4" (1 626 m)   Wt 58 5 kg (129 lb)   LMP 08/28/2020   BMI 22 14 kg/m²   General/Constitutional: No apparent distress: well-nourished and well developed  Eyes: normal ocular motion  Lymphatic: No appreciable lymphadenopathy  Respiratory: Non-labored breathing  Vascular: No edema, swelling or tenderness, except as noted in detailed exam   Integumentary: No impressive skin lesions present, except as noted in detailed exam   Neuro: No ataxia or tremors noted  Psych: Normal mood and affect, oriented to person, place and time  Appropriate affect    Musculoskeletal: Normal, except as noted in detailed exam and in HPI  Examination    right        Incision Clean, dry, intact  Sutures Previously removed  Ecchymosis none    Swelling none    Sensation Intact to light touch throughout sural, saphenous, superficial peroneal, deep peroneal and medial/lateral plantar nerve distributions  Purdum-Chelsea 5 07 filament (10g) testing deferred  Cardiovascular Brisk capillary refill < 2 seconds,intact DP and PT pulses    Special Tests None      Imaging Studies:   3 views of the ankle were taken, reviewed and interpreted independently that demonstrate hardware intact and in appropriate alignment, fracture site presents healed  Reviewed by me personally  Scribe Attestation    I,:   Bradford Arzola am acting as a scribe while in the presence of the attending physician :        I,:   Celsa Cabrales MD personally performed the services described in this documentation    as scribed in my presence :            Larose Fothergill Lachman, MD  Foot & Ankle Surgery   Department [address]      I personally performed the service  Larose Fothergill Lachman, MD

## 2020-09-18 NOTE — PROGRESS NOTES
KANIKA Duran  Attending, Orthopaedic Surgery  Foot and Ankle  [facility]      ORTHOPAEDIC FOOT AND ANKLE POST-OP VISIT     Procedure:     ORIF Right lateral process of talus fracture       Date of surgery:   6/24/20      PLAN  1  Weightbearing Status- WBAT operative extremity  2  DVT prophylaxis-  BID completed  3  Continue to elevate 23hrs/day getting up 1x per hour to prevent a blood clot  4  Pain control- OTC pain medication  5  RTC PRN  6  Xrays needed next visit - yes if she returns    History of Present Illness:   Chief Complaint: Right ankle injury     Neeru Byrnes is a 39 y o  female who is being seen for post-operative visit for the above procedure  Pain is well controlled and the patient has successfully transitioned to OTC pain medicines  she is taking ASA 325mg BID for DVT prophylaxis  Patient has been WBAT in a CAM boot then a sneaker  Review of Systems:  General- denies fever/chills  Respiratory- denies cough or SOB  Cardio- denies chest pain or palpitations  GI- denies abdominal pain  Musculoskeletal- Negative except noted above  Skin- denies rashes or wounds    Physical Exam:   [facility] 08/28/2020   General/Constitutional: No apparent distress: well-nourished and well developed  Eyes: normal ocular motion  Lymphatic: No appreciable lymphadenopathy  Respiratory: Non-labored breathing  Vascular: No edema, swelling or tenderness, except as noted in detailed exam   Integumentary: No impressive skin lesions present, except as noted in detailed exam   Neuro: No ataxia or tremors noted  Psych: Normal mood and affect, oriented to person, place and time  Appropriate affect  Musculoskeletal: Normal, except as noted in detailed exam and in HPI  Examination    right        Incision Clean, dry, intact  Sutures Previously removed      Ecchymosis none    Swelling Mild    Sensation Intact to light touch throughout sural, saphenous, superficial peroneal, deep peroneal and medial/lateral plantar nerve distributions  Landing-Chelsea 5 07 filament (10g) testing deferred  Cardiovascular Brisk capillary refill < 2 seconds,intact DP and PT pulses    Special Tests None      Imaging Studies:   3 views of the right ankle were taken, reviewed and interpreted independently that demonstrate hardware in expected position without interval displacement  Reviewed by me personally  Robin Quaker Lachman, MD  Foot & Ankle Surgery   Department of [address] [place] [address]      I personally performed the service  Robin Quaker Lachman, MD

## 2020-09-22 ENCOUNTER — OFFICE VISIT (OUTPATIENT)
Dept: PHYSICAL THERAPY | Age: 45
End: 2020-09-22
Payer: COMMERCIAL

## 2020-09-22 DIAGNOSIS — M79.671 RIGHT FOOT PAIN: Primary | ICD-10-CM

## 2020-09-22 DIAGNOSIS — S92.121D CLOSED DISPLACED FRACTURE OF BODY OF RIGHT TALUS WITH ROUTINE HEALING, SUBSEQUENT ENCOUNTER: ICD-10-CM

## 2020-09-22 PROCEDURE — 97110 THERAPEUTIC EXERCISES: CPT | Performed by: PHYSICAL THERAPIST

## 2020-09-22 PROCEDURE — 97112 NEUROMUSCULAR REEDUCATION: CPT | Performed by: PHYSICAL THERAPIST

## 2020-09-22 PROCEDURE — 97140 MANUAL THERAPY 1/> REGIONS: CPT | Performed by: PHYSICAL THERAPIST

## 2020-09-22 PROCEDURE — 97116 GAIT TRAINING THERAPY: CPT | Performed by: PHYSICAL THERAPIST

## 2020-09-22 NOTE — PROGRESS NOTES
Daily Note     Today's date: 2020  Patient name: Monie Chacon  : 1975  MRN: [id number]  Referring provider: Glo Ngo PA-C  Dx:   Encounter Diagnosis     ICD-10-CM    1  Right foot pain  M79 671    2  Closed displaced fracture of body of right talus with routine healing, subsequent encounter  S92 121D        Start Time: 0800  Stop Time: 0845  Total time in clinic (min): 45 minutes    Subjective: Pt reports improvements in symptoms  Objective: See treatment diary below      Assessment: Tolerated treatment well  Pt's had satisfactory evaluation at surgeon's office and does not need to return for medical follow-up  Pt is doing well functionally and was progressed to gastroc strengthening this session, she should continue as tolerated to improve strength and functional capacity  Patient demonstrated fatigue post treatment and would benefit from continued PT      Plan: Continue per plan of care        Precautions: N/A; no forceful subtalar motion (pronation/supination)      Manuals 7/28 8/4 8/11 8/20 8/24 8/27 9/7 9/15 9/22    DF/PF ROM JF JF JF JF JF VK   JF    Light TC joint mobs     JF JF JF JF JF    Lateral malleous mobs       JF JF     Calcenal mobs         JF    Neuro Re-Ed 7/28 8/4  8/18 8/24 8/27 9/7 9/15 9/22    Weight shifts biodex 10' 5' 5' 5' 5' 5'       Toe curls towel 5' 2*2"  3*2"         Inv/Ev AROM  2*15           PF/DF BOSU   3*10  3*10         Mini squats at bar   2*10 2*10 3*10 3*10  3*10 with foot instrinsics 3*10 with foot instrinsics    Vigor gym      2*2' 3*2' + mwm 2x15 3*2' + mwm 2x15     Tramp marching          nv    Star SLS with furniture movers          nv    Monster walks + lateral walks       RTB 2 laps ea      Ther Ex     TB 2 way 30"*2 30*2"           PF/DF stretch 30*5" 30*5" 15*10" 15*10"   5*30" 15*10" in standing     Nu-step  5' 5' recumabnt bike next visit  5' recumabnt bike next visit   5' bike upright shoe 5' bike upright shoe 5' bike 6' bike                                                                     Ther Activity                                       Gait Training    8/20 8/24 8/27 9/1 9/11     Weight shifts     5*10 5*10       Shoe with cane     15' 10' 10' no cane 10' stair navigation      Modalities

## 2020-09-25 ENCOUNTER — HOSPITAL ENCOUNTER (OUTPATIENT)
Dept: ULTRASOUND IMAGING | Facility: CLINIC | Age: 45
Discharge: HOME | End: 2020-09-25
Payer: COMMERCIAL

## 2020-09-25 ENCOUNTER — HOSPITAL ENCOUNTER (OUTPATIENT)
Dept: MAMMOGRAPHY | Facility: CLINIC | Age: 45
Discharge: HOME | End: 2020-09-25
Payer: COMMERCIAL

## 2020-09-25 ENCOUNTER — OFFICE VISIT (OUTPATIENT)
Dept: PHYSICAL THERAPY | Age: 45
End: 2020-09-25
Payer: COMMERCIAL

## 2020-09-25 ENCOUNTER — TRANSCRIBE ORDERS (OUTPATIENT)
Dept: MAMMOGRAPHY | Facility: CLINIC | Age: 45
End: 2020-09-25

## 2020-09-25 VITALS — TEMPERATURE: 96.8 F | HEIGHT: 64 IN | BODY MASS INDEX: 22.02 KG/M2 | WEIGHT: 129 LBS

## 2020-09-25 DIAGNOSIS — M79.671 RIGHT FOOT PAIN: Primary | ICD-10-CM

## 2020-09-25 DIAGNOSIS — R92.8 ABNORMAL FINDINGS ON DIAGNOSTIC IMAGING OF BREAST: Primary | ICD-10-CM

## 2020-09-25 DIAGNOSIS — R92.8 ABNORMAL MAMMOGRAM: ICD-10-CM

## 2020-09-25 DIAGNOSIS — S92.121D CLOSED DISPLACED FRACTURE OF BODY OF RIGHT TALUS WITH ROUTINE HEALING, SUBSEQUENT ENCOUNTER: ICD-10-CM

## 2020-09-25 PROCEDURE — 76642 ULTRASOUND BREAST LIMITED: CPT

## 2020-09-25 PROCEDURE — G0279 TOMOSYNTHESIS, MAMMO: HCPCS

## 2020-09-25 PROCEDURE — 97112 NEUROMUSCULAR REEDUCATION: CPT | Performed by: PHYSICAL THERAPIST

## 2020-09-25 PROCEDURE — 77065 DX MAMMO INCL CAD UNI: CPT

## 2020-09-25 PROCEDURE — 97140 MANUAL THERAPY 1/> REGIONS: CPT | Performed by: PHYSICAL THERAPIST

## 2020-09-25 NOTE — PROGRESS NOTES
Daily Note     Today's date: 2020  Patient name: Ayo Durand  : 1975  MRN: [id number]  Referring provider: Paulina Meehan PA-C  Dx:   Encounter Diagnosis     ICD-10-CM    1  Right foot pain  M79 671    2  Closed displaced fracture of body of right talus with routine healing, subsequent encounter  S92 121D                   Pt was treated  from 800 to 848z    Subjective: Pt reports continued improvement in pain and soreness of foot/ankle since previous session, and heightened confidence in her progress since recent visit with physician  However she continues to note some feelings if instability when walking  Objective: See treatment diary below      Assessment: Pt progressed today with additional dynamic balance and stability exercises  Tolerated treatment well  Pt demonstrates improved awareness off training to challenge/fatigue while avoiding pain  Pt demonstrated fatigue post treatment and would benefit from continued PT       Plan: Continue per plan of care        Precautions: N/A; no forceful subtalar motion (pronation/supination)      Manuals 7/28 8/4 8/11 8/20 8/24 8/27 9/7 9/15 9/22 9/25   DF/PF ROM JF JF JF JF JF VK   JF And scare message; HH   Light TC joint mobs     JF JF JF JF JF HH   Lateral malleous mobs       JF JF     Calcenal mobs         JF    Neuro Re-Ed 7/28 8/4  8/18 8/24 8/27 9/7 9/15 9/22 9/25   Weight shifts biodex 10' 5' 5' 5' 5' 5'       Toe curls towel 5' 2*2"  3*2"         Inv/Ev AROM  2*15           PF/DF BOSU   3*10  3*10         Mini squats at bar   2*10 2*10 3*10 3*10  3*10 with foot instrinsics 3*10 with foot instrinsics    Vigor gym      2*2' 3*2' + mwm 2x15 3*2' + mwm 2x15     Tramp marching          nv 30"x3   Star SLS with furniture movers          nv 5x   Tandem walk          25' x4   Rocker board          Static balance L/R 30"  Rocking 30"x2   Monster walks + lateral walks       RTB 2 laps ea      Ther Ex    TB 2 way 30"*2 30*2"           PF/DF stretch 30*5" 30*5" 15*10" 15*10"   5*30" 15*10" in standing     Nu-step  5' 5' recumabnt bike next visit  5' recumabnt bike next visit   5' bike upright shoe 5' bike upright shoe 5' bike 6' bike 6' bike                                                                    Ther Activity                                       Gait Training    8/20 8/24 8/27 9/1 9/11     Weight shifts     5*10 5*10       Shoe with cane     15' 10' 10' no cane 10' stair navigation      Modalities

## 2020-10-02 ENCOUNTER — OFFICE VISIT (OUTPATIENT)
Dept: PHYSICAL THERAPY | Age: 45
End: 2020-10-02
Payer: COMMERCIAL

## 2020-10-02 DIAGNOSIS — S92.121D CLOSED DISPLACED FRACTURE OF BODY OF RIGHT TALUS WITH ROUTINE HEALING, SUBSEQUENT ENCOUNTER: ICD-10-CM

## 2020-10-02 DIAGNOSIS — M79.671 RIGHT FOOT PAIN: Primary | ICD-10-CM

## 2020-10-02 PROCEDURE — 97140 MANUAL THERAPY 1/> REGIONS: CPT | Performed by: PHYSICAL THERAPIST

## 2020-10-02 PROCEDURE — 97112 NEUROMUSCULAR REEDUCATION: CPT | Performed by: PHYSICAL THERAPIST

## 2020-10-02 PROCEDURE — 97110 THERAPEUTIC EXERCISES: CPT | Performed by: PHYSICAL THERAPIST

## 2020-10-09 ENCOUNTER — OFFICE VISIT (OUTPATIENT)
Dept: PHYSICAL THERAPY | Age: 45
End: 2020-10-09
Payer: COMMERCIAL

## 2020-10-09 DIAGNOSIS — S92.121D CLOSED DISPLACED FRACTURE OF BODY OF RIGHT TALUS WITH ROUTINE HEALING, SUBSEQUENT ENCOUNTER: ICD-10-CM

## 2020-10-09 DIAGNOSIS — M79.671 RIGHT FOOT PAIN: Primary | ICD-10-CM

## 2020-10-09 PROCEDURE — 97112 NEUROMUSCULAR REEDUCATION: CPT | Performed by: PHYSICAL THERAPIST

## 2020-10-09 PROCEDURE — 97140 MANUAL THERAPY 1/> REGIONS: CPT | Performed by: PHYSICAL THERAPIST

## 2020-10-09 PROCEDURE — 97110 THERAPEUTIC EXERCISES: CPT | Performed by: PHYSICAL THERAPIST

## 2020-10-16 ENCOUNTER — EVALUATION (OUTPATIENT)
Dept: PHYSICAL THERAPY | Age: 45
End: 2020-10-16
Payer: COMMERCIAL

## 2020-10-16 DIAGNOSIS — M79.671 RIGHT FOOT PAIN: ICD-10-CM

## 2020-10-16 DIAGNOSIS — S92.121D CLOSED DISPLACED FRACTURE OF BODY OF RIGHT TALUS WITH ROUTINE HEALING, SUBSEQUENT ENCOUNTER: Primary | ICD-10-CM

## 2020-10-16 PROCEDURE — 97110 THERAPEUTIC EXERCISES: CPT | Performed by: PHYSICAL THERAPIST

## 2020-11-11 ENCOUNTER — OFFICE VISIT (OUTPATIENT)
Dept: URGENT CARE | Age: 45
End: 2020-11-11
Payer: COMMERCIAL

## 2020-11-11 DIAGNOSIS — J06.9 ACUTE URI: Primary | ICD-10-CM

## 2020-11-11 PROCEDURE — S9088 SERVICES PROVIDED IN URGENT: HCPCS | Performed by: PHYSICIAN ASSISTANT

## 2020-11-11 PROCEDURE — U0003 INFECTIOUS AGENT DETECTION BY NUCLEIC ACID (DNA OR RNA); SEVERE ACUTE RESPIRATORY SYNDROME CORONAVIRUS 2 (SARS-COV-2) (CORONAVIRUS DISEASE [COVID-19]), AMPLIFIED PROBE TECHNIQUE, MAKING USE OF HIGH THROUGHPUT TECHNOLOGIES AS DESCRIBED BY CMS-2020-01-R: HCPCS | Performed by: PHYSICIAN ASSISTANT

## 2020-11-11 PROCEDURE — 99213 OFFICE O/P EST LOW 20 MIN: CPT | Performed by: PHYSICIAN ASSISTANT

## 2020-11-13 LAB — SARS-COV-2 RNA SPEC QL NAA+PROBE: NOT DETECTED

## 2021-01-31 ENCOUNTER — IMMUNIZATIONS (OUTPATIENT)
Dept: FAMILY MEDICINE CLINIC | Facility: CLINIC | Age: 46
End: 2021-01-31

## 2021-01-31 DIAGNOSIS — Z23 ENCOUNTER FOR IMMUNIZATION: Primary | ICD-10-CM

## 2021-02-28 ENCOUNTER — IMMUNIZATIONS (OUTPATIENT)
Dept: FAMILY MEDICINE CLINIC | Facility: CLINIC | Age: 46
End: 2021-02-28

## 2021-02-28 DIAGNOSIS — Z23 ENCOUNTER FOR IMMUNIZATION: Primary | ICD-10-CM

## 2021-04-13 ENCOUNTER — HOSPITAL ENCOUNTER (OUTPATIENT)
Dept: MAMMOGRAPHY | Facility: CLINIC | Age: 46
Discharge: HOME | End: 2021-04-13
Payer: COMMERCIAL

## 2021-04-13 VITALS — BODY MASS INDEX: 22.02 KG/M2 | HEIGHT: 64 IN | WEIGHT: 129 LBS

## 2021-04-13 DIAGNOSIS — R92.8 ABNORMAL FINDINGS ON DIAGNOSTIC IMAGING OF BREAST: ICD-10-CM

## 2021-04-13 PROCEDURE — G0279 TOMOSYNTHESIS, MAMMO: HCPCS

## 2021-04-13 PROCEDURE — 77065 DX MAMMO INCL CAD UNI: CPT

## 2022-03-07 ENCOUNTER — HOSPITAL ENCOUNTER (OUTPATIENT)
Dept: MAMMOGRAPHY | Facility: CLINIC | Age: 47
Discharge: HOME | End: 2022-03-07
Payer: COMMERCIAL

## 2022-03-07 ENCOUNTER — HOSPITAL ENCOUNTER (OUTPATIENT)
Dept: ULTRASOUND IMAGING | Facility: CLINIC | Age: 47
Discharge: HOME | End: 2022-03-07
Payer: COMMERCIAL

## 2022-03-07 VITALS — WEIGHT: 120 LBS | HEIGHT: 64 IN | BODY MASS INDEX: 20.49 KG/M2

## 2022-03-07 DIAGNOSIS — R92.8 ABNORMAL FINDINGS ON DIAGNOSTIC IMAGING OF BREAST: ICD-10-CM

## 2022-03-07 DIAGNOSIS — R92.8 ABNORMAL MAMMOGRAM: ICD-10-CM

## 2022-03-07 PROCEDURE — G0279 TOMOSYNTHESIS, MAMMO: HCPCS

## 2022-03-07 PROCEDURE — 77066 DX MAMMO INCL CAD BI: CPT

## (undated) DEVICE — NON-THREADED KWIRE
Type: IMPLANTABLE DEVICE | Site: FOOT | Status: NON-FUNCTIONAL
Brand: MINI
Removed: 2020-06-24

## (undated) DEVICE — BRUSH EZ SCRUB PCMX W/NAIL CLEANER

## (undated) DEVICE — SUT VICRYL 2-0 CT-2 18 IN J726D

## (undated) DEVICE — TUBING SUCTION 5MM X 12 FT

## (undated) DEVICE — PADDING CAST 4 IN  COTTON STRL

## (undated) DEVICE — T9 DRIVER FOR 3.0MM SCREWS: Brand: MINI

## (undated) DEVICE — SUT ETHILON 3-0 PS-1 18 IN 1663G

## (undated) DEVICE — ACE WRAP 6 IN UNSTERILE

## (undated) DEVICE — DRAPE SHEET THREE QUARTER

## (undated) DEVICE — BETHLEHEM UNIVERSAL  MIONR EXT: Brand: CARDINAL HEALTH

## (undated) DEVICE — ABDOMINAL PAD: Brand: DERMACEA

## (undated) DEVICE — OCCLUSIVE GAUZE STRIP,3% BISMUTH TRIBROMOPHENATE IN PETROLATUM BLEND: Brand: XEROFORM

## (undated) DEVICE — SUT VICRYL 4-0 PS-2 18 IN J496G

## (undated) DEVICE — DISPOSABLE EQUIPMENT COVER: Brand: SMALL TOWEL DRAPE

## (undated) DEVICE — SUT ETHILON 3-0 PS-1 18 IN 1663H

## (undated) DEVICE — INTENDED FOR TISSUE SEPARATION, AND OTHER PROCEDURES THAT REQUIRE A SHARP SURGICAL BLADE TO PUNCTURE OR CUT.: Brand: BARD-PARKER ® CARBON RIB-BACK BLADES

## (undated) DEVICE — GAUZE SPONGES,16 PLY: Brand: CURITY

## (undated) DEVICE — CHLORAPREP HI-LITE 26ML ORANGE

## (undated) DEVICE — PAD CAST 6 IN COTTON NON STERILE

## (undated) DEVICE — T8 DRIVER FOR 2.5MM SCREWS: Brand: MINI

## (undated) DEVICE — 3.0MM DEPTH GAUGE/ COUNTERSINK: Brand: MINI

## (undated) DEVICE — DRAPE C-ARM X-RAY

## (undated) DEVICE — PENCIL ELECTROSURG E-Z CLEAN -0035H

## (undated) DEVICE — SPLINT 5 X 30 IN FAST SET PLASTER

## (undated) DEVICE — CANNULATED DRILL BIT: Brand: MINI

## (undated) DEVICE — LIGHT HANDLE COVER SLEEVE DISP BLUE STELLAR

## (undated) DEVICE — 3M™ DURAPORE™ SURGICAL TAPE 1538-1, 1 INCH X 10 YARD (2,5CM X 9,1M), 12 ROLLS/BOX: Brand: 3M™ DURAPORE™